# Patient Record
Sex: MALE | Race: BLACK OR AFRICAN AMERICAN
[De-identification: names, ages, dates, MRNs, and addresses within clinical notes are randomized per-mention and may not be internally consistent; named-entity substitution may affect disease eponyms.]

---

## 2018-02-05 PROBLEM — Z00.00 ENCOUNTER FOR PREVENTIVE HEALTH EXAMINATION: Status: ACTIVE | Noted: 2018-02-05

## 2019-06-24 ENCOUNTER — RESULT REVIEW (OUTPATIENT)
Age: 64
End: 2019-06-24

## 2019-12-16 ENCOUNTER — RESULT REVIEW (OUTPATIENT)
Age: 64
End: 2019-12-16

## 2020-04-14 ENCOUNTER — TRANSCRIPTION ENCOUNTER (OUTPATIENT)
Age: 65
End: 2020-04-14

## 2020-04-17 ENCOUNTER — EMERGENCY (EMERGENCY)
Facility: HOSPITAL | Age: 65
LOS: 1 days | Discharge: HOME | End: 2020-04-17
Attending: EMERGENCY MEDICINE
Payer: COMMERCIAL

## 2020-04-17 VITALS
OXYGEN SATURATION: 91 % | SYSTOLIC BLOOD PRESSURE: 119 MMHG | DIASTOLIC BLOOD PRESSURE: 64 MMHG | HEIGHT: 67 IN | TEMPERATURE: 100 F | WEIGHT: 149.91 LBS | HEART RATE: 85 BPM | RESPIRATION RATE: 20 BRPM

## 2020-04-17 PROCEDURE — 71045 X-RAY EXAM CHEST 1 VIEW: CPT

## 2020-04-17 PROCEDURE — 99283 EMERGENCY DEPT VISIT LOW MDM: CPT

## 2020-04-17 PROCEDURE — 71045 X-RAY EXAM CHEST 1 VIEW: CPT | Mod: 26

## 2020-04-17 PROCEDURE — 99284 EMERGENCY DEPT VISIT MOD MDM: CPT

## 2020-04-17 RX ORDER — ACETAMINOPHEN 500 MG
650 TABLET ORAL ONCE
Refills: 0 | Status: COMPLETED | OUTPATIENT
Start: 2020-04-17 | End: 2020-04-17

## 2020-04-17 RX ADMIN — Medication 650 MILLIGRAM(S): at 23:25

## 2020-04-17 RX ADMIN — Medication 100 MILLIGRAM(S): at 23:26

## 2020-04-17 NOTE — ED PROVIDER NOTE - PROGRESS NOTE DETAILS
Pt has maintained sats >91% on RA throughout ER stay, now at 95% states feeling a little more comfortable. Return precautions d/w daughter and pt at bedside, advised to continue albuterol as needed and can continue z pack, get rest and stay hydrated, return for new/worsening sx or sob.  Info sent to [facility] for f/u

## 2020-04-17 NOTE — ED ADULT NURSE NOTE - OBJECTIVE STATEMENT
Pt c/o cough and SOB on exertion x10 days. +COVID on 4/14. Pt currently taking Z-bonnie and Albuterol inhaler. Reports worsening GREEN today. Pt sitting up, speaking in full sentences. SPO2 93% on RA. No distress noted. Stretcher in low, locked position. Will continue to monitor

## 2020-04-17 NOTE — ED PROVIDER NOTE - OBJECTIVE STATEMENT
64 M no sig PMHx p/w sob x 1 day;  known COVID +ve on 4/14. Started having fevers about 1 week ago  took tylenol early this AM. Has been having dry cough x 1 week and worsening sob with activity. No sick contacts. no n/v/d/abd pain. Has poor appetite but forcing self to eat/drink. home SpO2 88 while speaking on mobile pulse ox. Has also been taking a Z pack, used albuterol once today.    is a Former smoker

## 2020-04-17 NOTE — ED PROVIDER NOTE - PATIENT PORTAL LINK FT
You can access the FollowMyHealth Patient Portal offered by [facility] by registering at the following website: [email or web address]. By joining [employer]’s FollowMyHealth portal, you will also be able to view your health information using other applications (apps) compatible with our system.

## 2020-04-17 NOTE — ED PROVIDER NOTE - ATTENDING CONTRIBUTION TO CARE
Patient known COVID-19 positive from outpatient, day 7-8 of symptoms, already on Azithro and albuterol presenting with worsening dyspnea at home.  Former smoker quit for many years.  Otherwise healthy, no medications.  On exam patient hypoxic to 93% on room air speaking in full sentences, minimal respiratory distress.  Will obtain CXR, likely discharge home with strict return precautions.

## 2020-04-17 NOTE — ED PROVIDER NOTE - CLINICAL SUMMARY MEDICAL DECISION MAKING FREE TEXT BOX
Pt p/w sob, fever, cough in setting of known COVID +ve, however symptoms are worsening. Suspect due to viral PNA. Appears to be maintaining sats in low 90s on RA, borderline admission criteria. Given pt is not toxic appearing and maintaining sats will obtain CXR to eval for poss bacterial PNA vs other cause of hyopxia, monitor, provide medications, and reassess for dispo.

## 2020-04-17 NOTE — ED PROVIDER NOTE - PHYSICAL EXAMINATION
General: Alert and Oriented. No apparent distress.  Head: Normocephalic and atraumatic.  Eyes: PERRLA with EOMI.  Neck: Supple. Trachea midline.   Cardiac: Normal S1 and S2 w/ RRR. No murmurs appreciated.   Pulmonary: Scant bibasilar rales. On RA speaking lowest SpO2 91%  Abdominal: Soft, non-tender. Normoactive bowel sounds.  Neurologic: No focal sensory or motor deficits.  Musculoskeletal: Strength appropriate in all 4 extremities for age with no limited ROM.  Skin: Color appropriate for race. Intact, warm, and well-perfused.  Psychiatric: Appropriate mood and affect. No apparent risk to self or others.

## 2020-04-17 NOTE — ED PROVIDER NOTE - NSFOLLOWUPINSTRUCTIONS_ED_ALL_ED_FT
You were seen and evaluated for possible COVID 19 infection. We think you are safe for discharge and would like you to follow up in a two to three days with your doctor by phone or in person.   You should treat fevers by taking Tylenol as needed.    You should stay hydrated and increase the amount of fluid you drink.    Return to the Emergency Department if your shortness of breath becomes worse, you become weak, or new symptoms develop.    You should monitor your temperature and quarantine yourself away from others - particularly the elderly, ill, or immunosuppressed    We recommend the below precautionary steps  - Do not go to work, school, or public areas. Avoid using public transportation, ride-sharing, or taxis.  -Wear a mask whenever you are around other people.  -Avoid sharing personal household items. You should not share dishes, drinking glasses, cups, eating utensils, towels, or bedding with other people or pets in your home. After using these items, they should be washed thoroughly with soap and water.   - Avoid touching your eyes, nose, and mouth with your hands.  - Wash your hands often with soap and water for at least 15 to 20 seconds or clean your hands with an alcohol-based hand  that contains 60 to 95% alcohol, covering all surfaces of your hands and rubbing them together until they feel dry. Soap and water should be used preferentially if hands are visibly dirty.

## 2020-04-17 NOTE — ED PROVIDER NOTE - SHIFT CHANGE DETAILS
Olive Morales MD - Attending Physician: Pt here with +COVID, feeling poorly. Reported hypoxia at home, though has remained with Sat > 90% since arrival on RA. Awaiting Xr, re-eval for likely dc if remains nonhypoxic

## 2020-04-18 VITALS
TEMPERATURE: 99 F | OXYGEN SATURATION: 94 % | HEART RATE: 63 BPM | DIASTOLIC BLOOD PRESSURE: 74 MMHG | SYSTOLIC BLOOD PRESSURE: 120 MMHG

## 2020-12-08 ENCOUNTER — OUTPATIENT (OUTPATIENT)
Dept: OUTPATIENT SERVICES | Facility: HOSPITAL | Age: 65
LOS: 1 days | End: 2020-12-08
Payer: MEDICARE

## 2020-12-08 ENCOUNTER — APPOINTMENT (OUTPATIENT)
Dept: RADIOLOGY | Facility: CLINIC | Age: 65
End: 2020-12-08
Payer: MEDICARE

## 2020-12-08 DIAGNOSIS — Z00.8 ENCOUNTER FOR OTHER GENERAL EXAMINATION: ICD-10-CM

## 2020-12-08 PROCEDURE — 71046 X-RAY EXAM CHEST 2 VIEWS: CPT | Mod: 26

## 2020-12-08 PROCEDURE — 71046 X-RAY EXAM CHEST 2 VIEWS: CPT

## 2022-05-09 ENCOUNTER — APPOINTMENT (OUTPATIENT)
Dept: ORTHOPEDIC SURGERY | Facility: CLINIC | Age: 67
End: 2022-05-09

## 2022-05-09 DIAGNOSIS — M75.42 IMPINGEMENT SYNDROME OF LEFT SHOULDER: ICD-10-CM

## 2024-09-03 ENCOUNTER — APPOINTMENT (OUTPATIENT)
Dept: ORTHOPEDIC SURGERY | Facility: CLINIC | Age: 69
End: 2024-09-03
Payer: MEDICARE

## 2024-09-03 VITALS — WEIGHT: 170 LBS | BODY MASS INDEX: 26.68 KG/M2 | HEIGHT: 67 IN

## 2024-09-03 DIAGNOSIS — M62.830 MUSCLE SPASM OF BACK: ICD-10-CM

## 2024-09-03 DIAGNOSIS — M43.16 SPONDYLOLISTHESIS, LUMBAR REGION: ICD-10-CM

## 2024-09-03 DIAGNOSIS — M54.16 RADICULOPATHY, LUMBAR REGION: ICD-10-CM

## 2024-09-03 DIAGNOSIS — Z78.9 OTHER SPECIFIED HEALTH STATUS: ICD-10-CM

## 2024-09-03 PROBLEM — Z86.69 PERSONAL HISTORY OF OTHER DISEASES OF THE NERVOUS SYSTEM AND SENSE ORGANS: Chronic | Status: ACTIVE | Noted: 2024-09-01

## 2024-09-03 PROCEDURE — 99204 OFFICE O/P NEW MOD 45 MIN: CPT

## 2024-09-03 PROCEDURE — 72100 X-RAY EXAM L-S SPINE 2/3 VWS: CPT

## 2024-09-03 PROCEDURE — 72170 X-RAY EXAM OF PELVIS: CPT

## 2024-09-03 RX ORDER — METHYLPREDNISOLONE 4 MG/1
4 TABLET ORAL
Qty: 1 | Refills: 0 | Status: ACTIVE | COMMUNITY
Start: 2024-09-03 | End: 1900-01-01

## 2024-09-03 RX ORDER — CYCLOBENZAPRINE HYDROCHLORIDE 5 MG/1
5 TABLET, FILM COATED ORAL
Qty: 30 | Refills: 0 | Status: ACTIVE | COMMUNITY
Start: 2024-09-03 | End: 1900-01-01

## 2024-09-03 NOTE — DISCUSSION/SUMMARY
[de-identified] : 69m with lumbar spasm, spondylolisthesis and RLE radicular symptoms 1) MDP rx, cyclobenzaprine rx and patient states understanding that the medication can make them sleepy and is not to drive/work while taking 2) start physical therapy 3) cryotherapy, rest and activity modification  4) rtc 4-6 weeks  Entered by Cady Noel acting as scribe. Dr. Hollis- The documentation recorded by the scribe accurately reflects the service I personally performed and the decisions made by me.

## 2024-09-03 NOTE — IMAGING
[Straightening consistent with spasm] : Straightening consistent with spasm [Disc space narrowing] : Disc space narrowing [Spondylolithesis] : Spondylolithesis [AP] : anteroposterior [There are no fractures, subluxations or dislocations. No significant abnormalities are seen] : There are no fractures, subluxations or dislocations. No significant abnormalities are seen

## 2024-09-03 NOTE — HISTORY OF PRESENT ILLNESS
[9] : 9 [Full time] : Work status: full time [de-identified] : 09/03/2024 Mr. MARIA DEL CARMEN FLORES, a 69 year old male, presents today for lower back. Reports that on 8/29/24 he started to experience right sided LBP radiating to the RLE to the foot. Denies n/t. Was seen at [facility] ED, no imaging done. Has been taking tylenol.  [] : no [FreeTextEntry5] : 68yo male presenting with lower back pain. Experiencing pinching pain since 8/29/24. Radiating sharp pain down RT leg to the RT foot. Denies N/T. Went to [facility]- no imaging done. Has been taking Advil. [de-identified] : [employer]

## 2024-10-15 ENCOUNTER — APPOINTMENT (OUTPATIENT)
Dept: ORTHOPEDIC SURGERY | Facility: CLINIC | Age: 69
End: 2024-10-15

## 2025-05-26 ENCOUNTER — NON-APPOINTMENT (OUTPATIENT)
Age: 70
End: 2025-05-26

## 2025-07-15 ENCOUNTER — APPOINTMENT (OUTPATIENT)
Dept: UROLOGY | Facility: CLINIC | Age: 70
End: 2025-07-15
Payer: MEDICARE

## 2025-07-15 VITALS — DIASTOLIC BLOOD PRESSURE: 81 MMHG | RESPIRATION RATE: 16 BRPM | SYSTOLIC BLOOD PRESSURE: 144 MMHG | HEART RATE: 58 BPM

## 2025-07-15 PROBLEM — K21.9 GERD (GASTROESOPHAGEAL REFLUX DISEASE): Status: ACTIVE | Noted: 2025-07-15

## 2025-07-15 PROBLEM — K42.9 UMBILICAL HERNIA: Status: ACTIVE | Noted: 2025-07-15

## 2025-07-15 PROBLEM — Z82.49 FAMILY HISTORY OF HYPERTENSION: Status: ACTIVE | Noted: 2025-07-15

## 2025-07-15 PROBLEM — R97.20 ELEVATED PSA: Status: ACTIVE | Noted: 2025-07-15

## 2025-07-15 PROBLEM — Z80.42 FAMILY HISTORY OF MALIGNANT NEOPLASM OF PROSTATE: Status: ACTIVE | Noted: 2025-07-15

## 2025-07-15 PROBLEM — Z87.891 FORMER SMOKER: Status: ACTIVE | Noted: 2025-07-15

## 2025-07-15 PROBLEM — Z78.9 NO PERTINENT PAST MEDICAL HISTORY: Status: RESOLVED | Noted: 2024-09-03 | Resolved: 2025-07-15

## 2025-07-15 PROBLEM — M75.42 IMPINGEMENT SYNDROME OF LEFT SHOULDER: Status: RESOLVED | Noted: 2022-05-09 | Resolved: 2025-07-15

## 2025-07-15 PROBLEM — M62.830 LUMBAR PARASPINAL MUSCLE SPASM: Status: RESOLVED | Noted: 2024-09-03 | Resolved: 2025-07-15

## 2025-07-15 PROBLEM — Z82.3 FAMILY HISTORY OF CEREBROVASCULAR ACCIDENT (CVA): Status: ACTIVE | Noted: 2025-07-15

## 2025-07-15 PROCEDURE — G2211 COMPLEX E/M VISIT ADD ON: CPT

## 2025-07-15 PROCEDURE — 99204 OFFICE O/P NEW MOD 45 MIN: CPT

## 2025-07-15 RX ORDER — PANTOPRAZOLE 40 MG/1
40 TABLET, DELAYED RELEASE ORAL DAILY
Qty: 1 | Refills: 0 | Status: ACTIVE | COMMUNITY
Start: 2025-07-15

## 2025-07-16 LAB
PSA FREE FLD-MCNC: 27 %
PSA FREE SERPL-MCNC: 1.79 NG/ML
PSA SERPL-MCNC: 6.55 NG/ML

## 2025-08-13 ENCOUNTER — APPOINTMENT (OUTPATIENT)
Dept: UROLOGY | Facility: CLINIC | Age: 70
End: 2025-08-13

## 2025-08-20 ENCOUNTER — APPOINTMENT (OUTPATIENT)
Dept: MRI IMAGING | Facility: IMAGING CENTER | Age: 70
End: 2025-08-20

## 2025-08-26 ENCOUNTER — APPOINTMENT (OUTPATIENT)
Dept: SURGERY | Facility: CLINIC | Age: 70
End: 2025-08-26
Payer: MEDICARE

## 2025-08-26 ENCOUNTER — NON-APPOINTMENT (OUTPATIENT)
Age: 70
End: 2025-08-26

## 2025-08-26 VITALS
OXYGEN SATURATION: 98 % | HEART RATE: 68 BPM | BODY MASS INDEX: 26.68 KG/M2 | TEMPERATURE: 97.3 F | HEIGHT: 67 IN | WEIGHT: 170 LBS | DIASTOLIC BLOOD PRESSURE: 82 MMHG | RESPIRATION RATE: 8 BRPM | SYSTOLIC BLOOD PRESSURE: 134 MMHG

## 2025-08-26 DIAGNOSIS — K42.9 UMBILICAL HERNIA W/OUT OBSTRUCTION OR GANGRENE: ICD-10-CM

## 2025-08-26 DIAGNOSIS — K40.90 UNILATERAL INGUINAL HERNIA, W/OUT OBSTRUCTION OR GANGRENE, NOT SPECIFIED AS RECURRENT: ICD-10-CM

## 2025-08-26 PROCEDURE — 99203 OFFICE O/P NEW LOW 30 MIN: CPT

## 2025-08-29 ENCOUNTER — APPOINTMENT (OUTPATIENT)
Dept: UROLOGY | Facility: CLINIC | Age: 70
End: 2025-08-29

## 2025-09-03 ENCOUNTER — OUTPATIENT (OUTPATIENT)
Dept: OUTPATIENT SERVICES | Facility: HOSPITAL | Age: 70
LOS: 1 days | End: 2025-09-03
Payer: COMMERCIAL

## 2025-09-03 VITALS
DIASTOLIC BLOOD PRESSURE: 82 MMHG | HEIGHT: 67 IN | TEMPERATURE: 98 F | RESPIRATION RATE: 16 BRPM | OXYGEN SATURATION: 98 % | SYSTOLIC BLOOD PRESSURE: 140 MMHG | HEART RATE: 85 BPM | WEIGHT: 166.01 LBS

## 2025-09-03 DIAGNOSIS — K42.9 UMBILICAL HERNIA WITHOUT OBSTRUCTION OR GANGRENE: ICD-10-CM

## 2025-09-03 DIAGNOSIS — Z98.890 OTHER SPECIFIED POSTPROCEDURAL STATES: Chronic | ICD-10-CM

## 2025-09-03 DIAGNOSIS — Z01.818 ENCOUNTER FOR OTHER PREPROCEDURAL EXAMINATION: ICD-10-CM

## 2025-09-03 DIAGNOSIS — K40.90 UNILATERAL INGUINAL HERNIA, WITHOUT OBSTRUCTION OR GANGRENE, NOT SPECIFIED AS RECURRENT: ICD-10-CM

## 2025-09-03 LAB
ANION GAP SERPL CALC-SCNC: 10 MMOL/L — SIGNIFICANT CHANGE UP (ref 5–17)
BUN SERPL-MCNC: 20 MG/DL — SIGNIFICANT CHANGE UP (ref 7–23)
CALCIUM SERPL-MCNC: 9.5 MG/DL — SIGNIFICANT CHANGE UP (ref 8.4–10.5)
CHLORIDE SERPL-SCNC: 105 MMOL/L — SIGNIFICANT CHANGE UP (ref 96–108)
CO2 SERPL-SCNC: 26 MMOL/L — SIGNIFICANT CHANGE UP (ref 22–31)
CREAT SERPL-MCNC: 1.06 MG/DL — SIGNIFICANT CHANGE UP (ref 0.5–1.3)
EGFR: 76 ML/MIN/1.73M2 — SIGNIFICANT CHANGE UP
EGFR: 76 ML/MIN/1.73M2 — SIGNIFICANT CHANGE UP
GLUCOSE SERPL-MCNC: 104 MG/DL — HIGH (ref 70–99)
HCT VFR BLD CALC: 44.3 % — SIGNIFICANT CHANGE UP (ref 39–50)
HGB BLD-MCNC: 14.8 G/DL — SIGNIFICANT CHANGE UP (ref 13–17)
MCHC RBC-ENTMCNC: 28.4 PG — SIGNIFICANT CHANGE UP (ref 27–34)
MCHC RBC-ENTMCNC: 33.4 G/DL — SIGNIFICANT CHANGE UP (ref 32–36)
MCV RBC AUTO: 84.9 FL — SIGNIFICANT CHANGE UP (ref 80–100)
NRBC # BLD AUTO: 0 K/UL — SIGNIFICANT CHANGE UP (ref 0–0)
NRBC # FLD: 0 K/UL — SIGNIFICANT CHANGE UP (ref 0–0)
NRBC BLD AUTO-RTO: 0 /100 WBCS — SIGNIFICANT CHANGE UP (ref 0–0)
PLATELET # BLD AUTO: 169 K/UL — SIGNIFICANT CHANGE UP (ref 150–400)
PMV BLD: 10.3 FL — SIGNIFICANT CHANGE UP (ref 7–13)
POTASSIUM SERPL-MCNC: 4.8 MMOL/L — SIGNIFICANT CHANGE UP (ref 3.5–5.3)
POTASSIUM SERPL-SCNC: 4.8 MMOL/L — SIGNIFICANT CHANGE UP (ref 3.5–5.3)
RBC # BLD: 5.22 M/UL — SIGNIFICANT CHANGE UP (ref 4.2–5.8)
RBC # FLD: 12.7 % — SIGNIFICANT CHANGE UP (ref 10.3–14.5)
SODIUM SERPL-SCNC: 141 MMOL/L — SIGNIFICANT CHANGE UP (ref 135–145)
WBC # BLD: 5.68 K/UL — SIGNIFICANT CHANGE UP (ref 3.8–10.5)
WBC # FLD AUTO: 5.68 K/UL — SIGNIFICANT CHANGE UP (ref 3.8–10.5)

## 2025-09-03 PROCEDURE — G0463: CPT

## 2025-09-03 PROCEDURE — 87641 MR-STAPH DNA AMP PROBE: CPT

## 2025-09-03 PROCEDURE — 87640 STAPH A DNA AMP PROBE: CPT

## 2025-09-03 PROCEDURE — 80048 BASIC METABOLIC PNL TOTAL CA: CPT

## 2025-09-03 PROCEDURE — 85027 COMPLETE CBC AUTOMATED: CPT

## 2025-09-04 DIAGNOSIS — A49.01 METHICILLIN SUSCEPTIBLE STAPHYLOCOCCUS AUREUS INFECTION, UNSPECIFIED SITE: ICD-10-CM

## 2025-09-04 LAB
MRSA PCR RESULT.: SIGNIFICANT CHANGE UP
S AUREUS DNA NOSE QL NAA+PROBE: DETECTED

## 2025-09-04 RX ORDER — MUPIROCIN 20 MG/G
2 OINTMENT TOPICAL
Qty: 1 | Refills: 0 | Status: ACTIVE | COMMUNITY
Start: 2025-09-04 | End: 1900-01-01

## 2025-09-12 ENCOUNTER — RESULT REVIEW (OUTPATIENT)
Age: 70
End: 2025-09-12

## 2025-09-12 ENCOUNTER — APPOINTMENT (OUTPATIENT)
Dept: MRI IMAGING | Facility: IMAGING CENTER | Age: 70
End: 2025-09-12
Payer: MEDICARE

## 2025-09-12 PROBLEM — K40.90 UNILATERAL INGUINAL HERNIA, WITHOUT OBSTRUCTION OR GANGRENE, NOT SPECIFIED AS RECURRENT: Chronic | Status: ACTIVE | Noted: 2025-09-03

## 2025-09-12 PROBLEM — K42.9 UMBILICAL HERNIA WITHOUT OBSTRUCTION OR GANGRENE: Chronic | Status: ACTIVE | Noted: 2025-09-03

## 2025-09-12 PROBLEM — N40.0 BENIGN PROSTATIC HYPERPLASIA WITHOUT LOWER URINARY TRACT SYMPTOMS: Chronic | Status: ACTIVE | Noted: 2025-09-03

## 2025-09-12 PROBLEM — Z86.16 PERSONAL HISTORY OF COVID-19: Chronic | Status: ACTIVE | Noted: 2025-09-03

## 2025-09-12 PROBLEM — K21.9 GASTRO-ESOPHAGEAL REFLUX DISEASE WITHOUT ESOPHAGITIS: Chronic | Status: ACTIVE | Noted: 2025-09-03

## 2025-09-12 PROCEDURE — 72197 MRI PELVIS W/O & W/DYE: CPT | Mod: 26

## 2025-09-12 PROCEDURE — 76498P: CUSTOM | Mod: 26

## 2025-09-17 ENCOUNTER — RESULT REVIEW (OUTPATIENT)
Age: 70
End: 2025-09-17

## 2025-09-18 ENCOUNTER — APPOINTMENT (OUTPATIENT)
Dept: SURGERY | Facility: HOSPITAL | Age: 70
End: 2025-09-18